# Patient Record
Sex: FEMALE | Employment: FULL TIME | URBAN - METROPOLITAN AREA
[De-identification: names, ages, dates, MRNs, and addresses within clinical notes are randomized per-mention and may not be internally consistent; named-entity substitution may affect disease eponyms.]

---

## 2018-03-15 ENCOUNTER — TELEPHONE (OUTPATIENT)
Dept: INTERNAL MEDICINE | Facility: CLINIC | Age: 33
End: 2018-03-15

## 2018-03-15 NOTE — TELEPHONE ENCOUNTER
----- Message from Luann Ha sent at 3/15/2018 10:32 AM CDT -----  Contact: pt  Pt need orders in for labs.    [phone] (home)

## 2018-03-15 NOTE — TELEPHONE ENCOUNTER
Pt requested lab orders to be completed before her appt with Dr. Woodward.  Notified pt that Dr. Woodward will order labs during her appt as she has never been seen in clinic.  Pt verbalized understanding.

## 2018-03-20 ENCOUNTER — LAB VISIT (OUTPATIENT)
Dept: LAB | Facility: HOSPITAL | Age: 33
End: 2018-03-20
Attending: INTERNAL MEDICINE
Payer: COMMERCIAL

## 2018-03-20 ENCOUNTER — OFFICE VISIT (OUTPATIENT)
Dept: OBSTETRICS AND GYNECOLOGY | Facility: CLINIC | Age: 33
End: 2018-03-20
Payer: COMMERCIAL

## 2018-03-20 ENCOUNTER — OFFICE VISIT (OUTPATIENT)
Dept: INTERNAL MEDICINE | Facility: CLINIC | Age: 33
End: 2018-03-20
Payer: COMMERCIAL

## 2018-03-20 VITALS
DIASTOLIC BLOOD PRESSURE: 72 MMHG | WEIGHT: 108.94 LBS | BODY MASS INDEX: 19.3 KG/M2 | SYSTOLIC BLOOD PRESSURE: 112 MMHG | HEIGHT: 63 IN

## 2018-03-20 VITALS
WEIGHT: 110.44 LBS | OXYGEN SATURATION: 99 % | SYSTOLIC BLOOD PRESSURE: 110 MMHG | HEART RATE: 77 BPM | TEMPERATURE: 97 F | BODY MASS INDEX: 19.57 KG/M2 | DIASTOLIC BLOOD PRESSURE: 72 MMHG | HEIGHT: 63 IN

## 2018-03-20 DIAGNOSIS — Z23 NEED FOR INFLUENZA VACCINATION: ICD-10-CM

## 2018-03-20 DIAGNOSIS — Z00.00 ROUTINE GENERAL MEDICAL EXAMINATION AT A HEALTH CARE FACILITY: Primary | ICD-10-CM

## 2018-03-20 DIAGNOSIS — Z00.00 ROUTINE GENERAL MEDICAL EXAMINATION AT A HEALTH CARE FACILITY: ICD-10-CM

## 2018-03-20 DIAGNOSIS — Z01.419 ENCOUNTER FOR GYNECOLOGICAL EXAMINATION WITHOUT ABNORMAL FINDING: Primary | ICD-10-CM

## 2018-03-20 LAB
ALBUMIN SERPL BCP-MCNC: 4.5 G/DL
ALP SERPL-CCNC: 66 U/L
ALT SERPL W/O P-5'-P-CCNC: 31 U/L
ANION GAP SERPL CALC-SCNC: 8 MMOL/L
AST SERPL-CCNC: 24 U/L
BASOPHILS # BLD AUTO: 0.04 K/UL
BASOPHILS NFR BLD: 0.7 %
BILIRUB SERPL-MCNC: 1 MG/DL
BUN SERPL-MCNC: 11 MG/DL
CALCIUM SERPL-MCNC: 9.6 MG/DL
CHLORIDE SERPL-SCNC: 101 MMOL/L
CHOLEST SERPL-MCNC: 158 MG/DL
CHOLEST/HDLC SERPL: 3.2 {RATIO}
CO2 SERPL-SCNC: 29 MMOL/L
CREAT SERPL-MCNC: 0.7 MG/DL
DIFFERENTIAL METHOD: ABNORMAL
EOSINOPHIL # BLD AUTO: 0 K/UL
EOSINOPHIL NFR BLD: 0.7 %
ERYTHROCYTE [DISTWIDTH] IN BLOOD BY AUTOMATED COUNT: 12.4 %
EST. GFR  (AFRICAN AMERICAN): >60 ML/MIN/1.73 M^2
EST. GFR  (NON AFRICAN AMERICAN): >60 ML/MIN/1.73 M^2
GLUCOSE SERPL-MCNC: 84 MG/DL
HCT VFR BLD AUTO: 45.2 %
HDLC SERPL-MCNC: 49 MG/DL
HDLC SERPL: 31 %
HGB BLD-MCNC: 14.4 G/DL
IMM GRANULOCYTES # BLD AUTO: 0.01 K/UL
IMM GRANULOCYTES NFR BLD AUTO: 0.2 %
LDLC SERPL CALC-MCNC: 96.8 MG/DL
LYMPHOCYTES # BLD AUTO: 2.4 K/UL
LYMPHOCYTES NFR BLD: 42 %
MCH RBC QN AUTO: 30.1 PG
MCHC RBC AUTO-ENTMCNC: 31.9 G/DL
MCV RBC AUTO: 95 FL
MONOCYTES # BLD AUTO: 0.4 K/UL
MONOCYTES NFR BLD: 7.1 %
NEUTROPHILS # BLD AUTO: 2.8 K/UL
NEUTROPHILS NFR BLD: 49.3 %
NONHDLC SERPL-MCNC: 109 MG/DL
NRBC BLD-RTO: 0 /100 WBC
PLATELET # BLD AUTO: 219 K/UL
PMV BLD AUTO: 12 FL
POTASSIUM SERPL-SCNC: 3.9 MMOL/L
PROT SERPL-MCNC: 7.4 G/DL
RBC # BLD AUTO: 4.78 M/UL
SODIUM SERPL-SCNC: 138 MMOL/L
TRIGL SERPL-MCNC: 61 MG/DL
TSH SERPL DL<=0.005 MIU/L-ACNC: 1.57 UIU/ML
WBC # BLD AUTO: 5.64 K/UL

## 2018-03-20 PROCEDURE — 36415 COLL VENOUS BLD VENIPUNCTURE: CPT | Mod: PO

## 2018-03-20 PROCEDURE — 80061 LIPID PANEL: CPT

## 2018-03-20 PROCEDURE — 99999 PR PBB SHADOW E&M-EST. PATIENT-LVL III: CPT | Mod: PBBFAC,,, | Performed by: INTERNAL MEDICINE

## 2018-03-20 PROCEDURE — 80053 COMPREHEN METABOLIC PANEL: CPT

## 2018-03-20 PROCEDURE — 84443 ASSAY THYROID STIM HORMONE: CPT

## 2018-03-20 PROCEDURE — 99385 PREV VISIT NEW AGE 18-39: CPT | Mod: 25,S$GLB,, | Performed by: INTERNAL MEDICINE

## 2018-03-20 PROCEDURE — 90686 IIV4 VACC NO PRSV 0.5 ML IM: CPT | Mod: S$GLB,,, | Performed by: INTERNAL MEDICINE

## 2018-03-20 PROCEDURE — 90471 IMMUNIZATION ADMIN: CPT | Mod: S$GLB,,, | Performed by: INTERNAL MEDICINE

## 2018-03-20 PROCEDURE — 99385 PREV VISIT NEW AGE 18-39: CPT | Mod: S$GLB,,, | Performed by: NURSE PRACTITIONER

## 2018-03-20 PROCEDURE — 88142 CYTOPATH C/V THIN LAYER: CPT

## 2018-03-20 PROCEDURE — 99999 PR PBB SHADOW E&M-EST. PATIENT-LVL III: CPT | Mod: PBBFAC,,, | Performed by: NURSE PRACTITIONER

## 2018-03-20 PROCEDURE — 85025 COMPLETE CBC W/AUTO DIFF WBC: CPT

## 2018-03-20 RX ORDER — ASCORBIC ACID 500 MG
500 TABLET ORAL DAILY
COMMUNITY

## 2018-03-20 NOTE — PROGRESS NOTES
"CC: Well woman exam    Anastasia Frances is a 32 y.o. female  presents for well woman exam.  LMP: Patient's last menstrual period was 2018 (approximate)..    Pt is [facility], lives in [facility], but working at [employer] in [place] [facility] probably for rest of this year.  Had annual exam done last year in [place] and mmg was ordered at [facility] - pt states has mmg and u/s due to nodule noted in left breast, and on mmg noted something in right.  Was told benign and repeat in 6 mths.  Has repeat already setup.       History reviewed. No pertinent past medical history.  Past Surgical History:   Procedure Laterality Date    BREAST SURGERY      Augmentation    LIPOSUCTION       Social History     Social History    Marital status:      Spouse name: N/A    Number of children: N/A    Years of education: N/A     Occupational History    Not on file.     Social History Main Topics    Smoking status: Never Smoker    Smokeless tobacco: Never Used    Alcohol use No    Drug use: No    Sexual activity: Yes     Partners: Male     Other Topics Concern    Not on file     Social History Narrative    No narrative on file     Family History   Problem Relation Age of Onset    Hypertension Mother     Heart disease Father     Cancer Neg Hx      OB History      Para Term  AB Living    1       1      SAB TAB Ectopic Multiple Live Births    1                  /72   Ht 5' 3" (1.6 m)   Wt 49.4 kg (108 lb 14.5 oz)   LMP 2018 (Approximate)   BMI 19.29 kg/m²       ROS:  GENERAL: Denies weight gain or weight loss. Feeling well overall.   SKIN: Denies rash or lesions.   HEAD: Denies head injury or headache.   NODES: Denies enlarged lymph nodes.   CHEST: Denies chest pain or shortness of breath.   CARDIOVASCULAR: Denies palpitations or left sided chest pain.   ABDOMEN: No abdominal pain, constipation, diarrhea, nausea, vomiting or rectal bleeding.   URINARY: No frequency, dysuria, hematuria, or " burning on urination.  REPRODUCTIVE: See HPI.   BREASTS: The patient performs breast self-examination and denies pain, lumps, or nipple discharge.   HEMATOLOGIC: No easy bruisability or excessive bleeding.   MUSCULOSKELETAL: Denies joint pain or swelling.   NEUROLOGIC: Denies syncope or weakness.   PSYCHIATRIC: Denies depression, anxiety or mood swings.    PHYSICAL EXAM:  APPEARANCE: Well nourished, well developed, in no acute distress.  AFFECT: WNL, alert and oriented x 3  SKIN: No acne or hirsutism  NECK: Neck symmetric without masses or thyromegaly  NODES: No inguinal, cervical, axillary, or femoral lymph node enlargement  CHEST: Good respiratory effect  ABDOMEN: Soft.  No tenderness or masses.  No hepatosplenomegaly.  No hernias.  BREASTS: Symmetrical - augmented bilaterally , no skin changes or visible lesions. Small pea size palpable mass just under left areola - which is being followed at [facility] by gyn in [place], no nipple discharge bilaterally.  PELVIC: Normal external genitalia without lesions.  Normal hair distribution.  Adequate perineal body, normal urethral meatus.  Vagina moist and well rugated without lesions or discharge.  Cervix pink - stenotic , without lesions, discharge or tenderness.  No significant cystocele or rectocele.  Bimanual exam shows uterus to be normal size, regular, mobile and nontender.  Adnexa without masses or tenderness.    EXTREMITIES: No edema.  Physical Exam    1. Encounter for gynecological examination without abnormal finding  Liquid-based pap smear, screening    AND PLAN:    Patient was counseled today on A.C.S. Pap guidelines and recommendations for yearly pelvic exams, mammograms and monthly self breast exams; to see her PCP for other health maintenance.

## 2018-03-20 NOTE — PROGRESS NOTES
"Subjective:      Patient ID: Anastasia Frances is a 32 y.o. female.    Chief Complaint: Establish Care    33 yo with There is no problem list on file for this patient.  History reviewed. No pertinent past medical history.    Here today for annual prev exam. No first degree rel with colon or breast cancer. Non smoker.      Social History     Social History    Marital status:      Spouse name: N/A    Number of children: N/A    Years of education: N/A     Occupational History    Not on file.     Social History Main Topics    Smoking status: Never Smoker    Smokeless tobacco: Never Used    Alcohol use No    Drug use: No    Sexual activity: Yes     Partners: Male     Other Topics Concern    Not on file     Social History Narrative    No narrative on file       family history includes Heart disease in her father; Hypertension in her mother.     Review of Systems   Constitutional: Negative for chills and fever.   HENT: Negative for ear pain and sore throat.    Respiratory: Negative for cough.    Cardiovascular: Negative for chest pain.   Gastrointestinal: Negative for abdominal pain and blood in stool.   Genitourinary: Negative for dysuria and hematuria.   Neurological: Negative for seizures and syncope.     Objective:   /72 (BP Location: Right arm, Patient Position: Sitting)   Pulse 77   Temp 96.9 °F (36.1 °C) (Tympanic)   Ht 5' 3" (1.6 m)   Wt 50.1 kg (110 lb 7.2 oz)   LMP 03/13/2018 (Approximate)   SpO2 99%   BMI 19.57 kg/m²     Physical Exam   Constitutional: She is oriented to person, place, and time. She appears well-developed and well-nourished. No distress.   HENT:   Head: Normocephalic and atraumatic.   Mouth/Throat: Oropharynx is clear and moist.   Eyes: EOM are normal. Pupils are equal, round, and reactive to light.   Neck: Neck supple. No thyromegaly present.   Cardiovascular: Normal rate and regular rhythm.    Pulmonary/Chest: Breath sounds normal. She has no wheezes. She has no " rales.   Abdominal: Soft. Bowel sounds are normal. There is no tenderness.   Lymphadenopathy:     She has no cervical adenopathy.   Neurological: She is alert and oriented to person, place, and time.   Skin: Skin is warm and dry.   Psychiatric: She has a normal mood and affect. Her behavior is normal.       Assessment:     1. Routine general medical examination at a health care facility    2. Need for influenza vaccination      Plan:   Routine general medical examination at a health care facility  -     Comprehensive metabolic panel; Future; Expected date: 03/20/2018  -     CBC auto differential; Future; Expected date: 03/20/2018  -     TSH; Future; Expected date: 03/20/2018  -     Lipid panel; Future; Expected date: 03/20/2018  -     HIV-1 and HIV-2 antibodies; Future; Expected date: 03/20/2018    Need for influenza vaccination    Other orders  -     Influenza - Quadrivalent (3 years & older) (PF)        Lab Frequency Next Occurrence   Comprehensive metabolic panel Once 03/20/2018   CBC auto differential Once 03/20/2018   TSH Once 03/20/2018   Lipid panel Once 03/20/2018       Problem List Items Addressed This Visit     None      Visit Diagnoses     Routine general medical examination at a health care facility    -  Primary    Relevant Orders    Comprehensive metabolic panel (Completed)    CBC auto differential (Completed)    TSH (Completed)    Lipid panel (Completed)    HIV-1 and HIV-2 antibodies    Need for influenza vaccination              Follow-up in about 1 year (around 3/20/2019), or if symptoms worsen or fail to improve.

## 2018-03-22 ENCOUNTER — PATIENT MESSAGE (OUTPATIENT)
Dept: OBSTETRICS AND GYNECOLOGY | Facility: CLINIC | Age: 33
End: 2018-03-22

## 2018-03-26 ENCOUNTER — PATIENT MESSAGE (OUTPATIENT)
Dept: OBSTETRICS AND GYNECOLOGY | Facility: CLINIC | Age: 33
End: 2018-03-26

## 2018-07-10 ENCOUNTER — OFFICE VISIT (OUTPATIENT)
Dept: OBSTETRICS AND GYNECOLOGY | Facility: CLINIC | Age: 33
End: 2018-07-10
Payer: COMMERCIAL

## 2018-07-10 VITALS
DIASTOLIC BLOOD PRESSURE: 68 MMHG | BODY MASS INDEX: 20.46 KG/M2 | WEIGHT: 115.5 LBS | SYSTOLIC BLOOD PRESSURE: 118 MMHG | HEIGHT: 63 IN

## 2018-07-10 DIAGNOSIS — R30.0 DYSURIA: ICD-10-CM

## 2018-07-10 DIAGNOSIS — N89.8 VAGINAL IRRITATION: Primary | ICD-10-CM

## 2018-07-10 PROCEDURE — 87086 URINE CULTURE/COLONY COUNT: CPT

## 2018-07-10 PROCEDURE — 99213 OFFICE O/P EST LOW 20 MIN: CPT | Mod: S$GLB,,, | Performed by: NURSE PRACTITIONER

## 2018-07-10 PROCEDURE — 99999 PR PBB SHADOW E&M-EST. PATIENT-LVL III: CPT | Mod: PBBFAC,,, | Performed by: NURSE PRACTITIONER

## 2018-07-10 PROCEDURE — 3008F BODY MASS INDEX DOCD: CPT | Mod: CPTII,S$GLB,, | Performed by: NURSE PRACTITIONER

## 2018-07-10 PROCEDURE — 87660 TRICHOMONAS VAGIN DIR PROBE: CPT

## 2018-07-10 RX ORDER — NYSTATIN AND TRIAMCINOLONE ACETONIDE 100000; 1 [USP'U]/G; MG/G
CREAM TOPICAL
Qty: 30 G | Refills: 1 | Status: SHIPPED | OUTPATIENT
Start: 2018-07-10 | End: 2019-07-10

## 2018-07-10 RX ORDER — ZINC GLUCONATE 50 MG
50 TABLET ORAL DAILY
COMMUNITY

## 2018-07-10 NOTE — PROGRESS NOTES
"CC: vaginal burning and irritation     Anastasia Frances is a 32 y.o. female  presents for burning and irritation. Patient states that she has had vaginal irritation and OTC meds are not resolving her issues.  LMP: Patient's last menstrual period was 2018.. Patient states that she had mild dysuria and " took some left over antibiotics" .Urine in clinic was normal.     History reviewed. No pertinent past medical history.  Past Surgical History:   Procedure Laterality Date    BREAST SURGERY      Augmentation    LIPOSUCTION       Social History     Social History    Marital status:      Spouse name: N/A    Number of children: N/A    Years of education: N/A     Occupational History    Not on file.     Social History Main Topics    Smoking status: Never Smoker    Smokeless tobacco: Never Used    Alcohol use No    Drug use: No    Sexual activity: Yes     Partners: Male     Other Topics Concern    Not on file     Social History Narrative    No narrative on file     Family History   Problem Relation Age of Onset    Hypertension Mother     Heart disease Father     Cancer Neg Hx      OB History      Para Term  AB Living    1       1      SAB TAB Ectopic Multiple Live Births    1                  /68 (BP Location: Right arm, Patient Position: Sitting, BP Method: Medium (Manual))   Ht 5' 3" (1.6 m)   Wt 52.4 kg (115 lb 8.3 oz)   LMP 2018   BMI 20.46 kg/m²       ROS:  CHEST: Denies chest pain or shortness of breath.   CARDIOVASCULAR: Denies palpitations or left sided chest pain.   ABDOMEN: No abdominal pain, constipation, diarrhea, nausea, vomiting or rectal bleeding.   URINARY: HPI  REPRODUCTIVE: See HPI.     PHYSICAL EXAM:  APPEARANCE: Well nourished, well developed, in no acute distress.  AFFECT: WNL, alert and oriented x 3  PELVIC: External genitalia several raise areas consistent to folliculitis. Adequate perineal body, normal urethral meatus.  Vagina moist and " well rugated without lesions, scant , white discharge.  Cervix pink, without lesions, discharge or tenderness.      1. Vaginal irritation  Vaginosis Screen by DNA Probe    Ambulatory consult to Dermatology   2. Dysuria  Urine culture     PLAN:  Urine sent for cx   Affirm cx  Mycolog cream   Dermatology referral per patient request       Patient was counseled today on A.C.S. Pap guidelines and recommendations for yearly pelvic exams, mammograms and monthly self breast exams; to see her PCP for other health maintenance.

## 2018-07-11 LAB
CANDIDA RRNA VAG QL PROBE: NEGATIVE
G VAGINALIS RRNA GENITAL QL PROBE: NEGATIVE
T VAGINALIS RRNA GENITAL QL PROBE: NEGATIVE

## 2018-07-12 LAB — BACTERIA UR CULT: NO GROWTH

## 2018-07-13 ENCOUNTER — PATIENT MESSAGE (OUTPATIENT)
Dept: OBSTETRICS AND GYNECOLOGY | Facility: CLINIC | Age: 33
End: 2018-07-13

## 2018-07-19 ENCOUNTER — TELEPHONE (OUTPATIENT)
Dept: OBSTETRICS AND GYNECOLOGY | Facility: CLINIC | Age: 33
End: 2018-07-19

## 2018-07-19 DIAGNOSIS — N30.00 ACUTE CYSTITIS WITHOUT HEMATURIA: Primary | ICD-10-CM

## 2018-07-19 NOTE — TELEPHONE ENCOUNTER
----- Message from Debbie Trammell NP sent at 7/19/2018 11:29 AM CDT -----  Needs urine cx next week

## 2018-07-19 NOTE — TELEPHONE ENCOUNTER
Spoke with pt notified of repeat urine cx. Scheduled for 7/26/18 4PM [facility]. Patient verbalized understanding

## 2018-07-25 ENCOUNTER — PATIENT MESSAGE (OUTPATIENT)
Dept: INTERNAL MEDICINE | Facility: CLINIC | Age: 33
End: 2018-07-25

## 2018-07-25 ENCOUNTER — PATIENT MESSAGE (OUTPATIENT)
Dept: OBSTETRICS AND GYNECOLOGY | Facility: CLINIC | Age: 33
End: 2018-07-25

## 2018-07-26 ENCOUNTER — LAB VISIT (OUTPATIENT)
Dept: LAB | Facility: HOSPITAL | Age: 33
End: 2018-07-26
Attending: NURSE PRACTITIONER
Payer: COMMERCIAL

## 2018-07-26 DIAGNOSIS — N30.00 ACUTE CYSTITIS WITHOUT HEMATURIA: ICD-10-CM

## 2018-07-26 PROCEDURE — 87086 URINE CULTURE/COLONY COUNT: CPT

## 2018-07-28 ENCOUNTER — PATIENT MESSAGE (OUTPATIENT)
Dept: OBSTETRICS AND GYNECOLOGY | Facility: CLINIC | Age: 33
End: 2018-07-28

## 2018-07-28 LAB — BACTERIA UR CULT: NORMAL

## 2020-10-30 ENCOUNTER — APPOINTMENT (RX ONLY)
Dept: URBAN - METROPOLITAN AREA CLINIC 121 | Facility: CLINIC | Age: 35
Setting detail: DERMATOLOGY
End: 2020-10-30

## 2020-10-30 DIAGNOSIS — L65.0 TELOGEN EFFLUVIUM: ICD-10-CM

## 2020-10-30 PROCEDURE — ? COUNSELING

## 2020-10-30 PROCEDURE — 99202 OFFICE O/P NEW SF 15 MIN: CPT

## 2020-10-30 ASSESSMENT — LOCATION SIMPLE DESCRIPTION DERM
LOCATION SIMPLE: POSTERIOR SCALP
LOCATION SIMPLE: SCALP

## 2020-10-30 ASSESSMENT — LOCATION ZONE DERM: LOCATION ZONE: SCALP

## 2020-10-30 ASSESSMENT — LOCATION DETAILED DESCRIPTION DERM
LOCATION DETAILED: LEFT SUPERIOR PARIETAL SCALP
LOCATION DETAILED: MID-OCCIPITAL SCALP

## 2020-10-30 NOTE — HPI: HAIR LOSS
How Severe Is Your Hair Loss?: mild
Additional History: Pt took biotin for 3 months with no improvement.

## 2022-06-21 ENCOUNTER — HOSPITAL ENCOUNTER (OUTPATIENT)
Dept: HOSPITAL 88 - CT | Age: 37
End: 2022-06-21
Attending: UROLOGY
Payer: COMMERCIAL

## 2022-06-21 DIAGNOSIS — N39.0: Primary | ICD-10-CM

## 2022-06-21 DIAGNOSIS — R31.21: ICD-10-CM

## 2022-06-21 PROCEDURE — 81025 URINE PREGNANCY TEST: CPT

## 2022-06-21 PROCEDURE — 74178 CT ABD&PLV WO CNTR FLWD CNTR: CPT
